# Patient Record
Sex: FEMALE | Race: WHITE | NOT HISPANIC OR LATINO
[De-identification: names, ages, dates, MRNs, and addresses within clinical notes are randomized per-mention and may not be internally consistent; named-entity substitution may affect disease eponyms.]

---

## 2019-02-25 ENCOUNTER — APPOINTMENT (OUTPATIENT)
Dept: NEUROLOGY | Facility: CLINIC | Age: 44
End: 2019-02-25
Payer: COMMERCIAL

## 2019-02-25 VITALS
SYSTOLIC BLOOD PRESSURE: 144 MMHG | DIASTOLIC BLOOD PRESSURE: 78 MMHG | HEART RATE: 82 BPM | WEIGHT: 252 LBS | HEIGHT: 66 IN | BODY MASS INDEX: 40.5 KG/M2 | TEMPERATURE: 98.3 F

## 2019-02-25 DIAGNOSIS — I10 ESSENTIAL (PRIMARY) HYPERTENSION: ICD-10-CM

## 2019-02-25 DIAGNOSIS — Z82.3 FAMILY HISTORY OF STROKE: ICD-10-CM

## 2019-02-25 DIAGNOSIS — E78.00 PURE HYPERCHOLESTEROLEMIA, UNSPECIFIED: ICD-10-CM

## 2019-02-25 DIAGNOSIS — Z83.79 FAMILY HISTORY OF OTHER DISEASES OF THE DIGESTIVE SYSTEM: ICD-10-CM

## 2019-02-25 DIAGNOSIS — E11.9 TYPE 2 DIABETES MELLITUS W/OUT COMPLICATIONS: ICD-10-CM

## 2019-02-25 DIAGNOSIS — Z83.3 FAMILY HISTORY OF DIABETES MELLITUS: ICD-10-CM

## 2019-02-25 DIAGNOSIS — Z82.49 FAMILY HISTORY OF ISCHEMIC HEART DISEASE AND OTHER DISEASES OF THE CIRCULATORY SYSTEM: ICD-10-CM

## 2019-02-25 DIAGNOSIS — Z80.3 FAMILY HISTORY OF MALIGNANT NEOPLASM OF BREAST: ICD-10-CM

## 2019-02-25 DIAGNOSIS — F41.9 ANXIETY DISORDER, UNSPECIFIED: ICD-10-CM

## 2019-02-25 DIAGNOSIS — Z87.891 PERSONAL HISTORY OF NICOTINE DEPENDENCE: ICD-10-CM

## 2019-02-25 PROBLEM — Z00.00 ENCOUNTER FOR PREVENTIVE HEALTH EXAMINATION: Status: ACTIVE | Noted: 2019-02-25

## 2019-02-25 PROCEDURE — 99244 OFF/OP CNSLTJ NEW/EST MOD 40: CPT

## 2019-02-25 RX ORDER — PREDNISONE 20 MG/1
20 TABLET ORAL DAILY
Qty: 28 | Refills: 0 | Status: ACTIVE | COMMUNITY
Start: 2019-02-25 | End: 1900-01-01

## 2019-02-25 RX ORDER — VALACYCLOVIR 1 G/1
1 TABLET, FILM COATED ORAL 3 TIMES DAILY
Qty: 21 | Refills: 0 | Status: ACTIVE | COMMUNITY
Start: 2019-02-25 | End: 1900-01-01

## 2019-02-26 PROBLEM — E11.9 DIABETES MELLITUS: Status: ACTIVE | Noted: 2019-02-25

## 2019-02-26 PROBLEM — Z83.79 FAMILY HISTORY OF GALLSTONES: Status: ACTIVE | Noted: 2019-02-25

## 2019-02-26 PROBLEM — Z82.49 FAMILY HISTORY OF HYPERTENSION: Status: ACTIVE | Noted: 2019-02-25

## 2019-02-26 PROBLEM — Z82.3 FAMILY HISTORY OF CEREBROVASCULAR ACCIDENT (CVA): Status: ACTIVE | Noted: 2019-02-25

## 2019-02-26 PROBLEM — I10 HYPERTENSION: Status: ACTIVE | Noted: 2019-02-25

## 2019-02-26 PROBLEM — F41.9 ANXIETY: Status: ACTIVE | Noted: 2019-02-25

## 2019-02-26 PROBLEM — E78.00 HIGH CHOLESTEROL: Status: ACTIVE | Noted: 2019-02-25

## 2019-02-26 PROBLEM — Z80.3 FAMILY HISTORY OF MALIGNANT NEOPLASM OF BREAST: Status: ACTIVE | Noted: 2019-02-25

## 2019-02-26 PROBLEM — Z87.891 FORMER SMOKER: Status: ACTIVE | Noted: 2019-02-25

## 2019-02-26 PROBLEM — Z83.3 FAMILY HISTORY OF DIABETES MELLITUS: Status: ACTIVE | Noted: 2019-02-25

## 2019-02-26 RX ORDER — ATORVASTATIN CALCIUM 20 MG/1
20 TABLET, FILM COATED ORAL
Qty: 30 | Refills: 0 | Status: ACTIVE | COMMUNITY
Start: 2018-09-29

## 2019-02-26 RX ORDER — BLOOD SUGAR DIAGNOSTIC
STRIP MISCELLANEOUS
Qty: 50 | Refills: 0 | Status: ACTIVE | COMMUNITY
Start: 2018-09-04

## 2019-02-26 RX ORDER — METFORMIN HYDROCHLORIDE 1000 MG/1
TABLET, FILM COATED, EXTENDED RELEASE ORAL
Refills: 0 | Status: ACTIVE | COMMUNITY

## 2019-02-26 RX ORDER — RAMIPRIL 5 MG/1
5 CAPSULE ORAL
Refills: 0 | Status: ACTIVE | COMMUNITY

## 2019-02-26 RX ORDER — DULAGLUTIDE 1.5 MG/.5ML
1.5 INJECTION, SOLUTION SUBCUTANEOUS
Refills: 0 | Status: ACTIVE | COMMUNITY

## 2019-02-26 RX ORDER — ESCITALOPRAM OXALATE 10 MG/1
10 TABLET, FILM COATED ORAL
Refills: 0 | Status: ACTIVE | COMMUNITY

## 2019-02-26 RX ORDER — LANCETS 33 GAUGE
EACH MISCELLANEOUS
Qty: 100 | Refills: 0 | Status: ACTIVE | COMMUNITY
Start: 2018-09-04

## 2019-02-26 RX ORDER — METOPROLOL SUCCINATE 100 MG/1
100 TABLET, EXTENDED RELEASE ORAL
Qty: 30 | Refills: 0 | Status: ACTIVE | COMMUNITY
Start: 2018-08-29

## 2019-02-26 NOTE — HISTORY OF PRESENT ILLNESS
[FreeTextEntry1] : This is a 43 year old right handed woman who is being seen in neurologic consultation at the request of Dr. Guerra for right facial numbness.  Patient noted some numbness and tingling on the right side of her face on Monday.  This morning she noted difficulty with speaking and perhaps even chewing.  She felt her face was droopy.  She has no pain, no headache, no hearing loss.  She has chronic tinnitus from a previous viral labyrinthitis years ago.  She is recovering from a GI illness over the last week.  No change in taste, notes dry right eye.

## 2019-02-26 NOTE — REASON FOR VISIT
[Acute] : an acute visit [FreeTextEntry1] : Right side of mouth and cheek are numb mouth is dropping

## 2019-02-26 NOTE — CONSULT LETTER
[Dear  ___] : Dear  [unfilled], [Consult Letter:] : I had the pleasure of evaluating your patient, [unfilled]. [Consult Closing:] : Thank you very much for allowing me to participate in the care of this patient.  If you have any questions, please do not hesitate to contact me. [FreeTextEntry3] : Sincerely,\par \par Demond Radford M.D.\par

## 2019-02-26 NOTE — REVIEW OF SYSTEMS
[Numbness] : numbness [Anxiety] : anxiety [Diarrhea] : diarrhea [Joint Pain] : joint pain [Negative] : Heme/Lymph [As Noted in HPI] : as noted in HPI [FreeTextEntry4] : ringing in ears [FreeTextEntry9] : not new

## 2019-02-26 NOTE — ASSESSMENT
[FreeTextEntry1] : This is Bell's palsy- likely due to viral etiology.  \par Begin steroids.  Will discuss with Dr. Guerra as patient's blood sugars will be difficult to control.  She will be weaned slowly after one week.\par Valacyclovir 1 gram tid for one week.\par Lacrilube to the right eye at night and sleep with eye patch.  Artificial tears for dryness during the day.  This is to prevent corneal abrasaion as she cannot complete close her right eye.\par Imaging to see if any inflammation of Cranial nerve 7 exists.\par RTC in one week.

## 2019-02-26 NOTE — PHYSICAL EXAM
[FreeTextEntry1] : Physical examination \par General: No acute distress, Awake, Alert. \par Fundus: disc margins sharp.\par Neck: no Carotid bruit. \par Cardiovascular: Normal rate, Regular rhythm, No murmur. \par Chest - clear bilaterally\par Ear: TM clear bilaterally.\par \par Mental status \par Awake, alert, and oriented to person, time and place, Normal attention span and concentration, Recent and remote memory intact, Language intact, Fund of knowledge intact. \par Cranial Nerves \par II: VFF \par III, IV, VI: PERRL, EOMI. Inability to fully close her right eye.  \par V: Facial sensation is normal B/L.  Difficulty with raising eyebrow on the right. \par VII: Right facial droop.  Asymmetric smile \par VIII: Gross hearing is intact.  \par IX, X: Palate is midline and elevates symmetrically. \par XI: Trapezius normal strength. \par XII: Tongue midline without atrophy or fasciculations. \par \par Motor exam \par Muscle tone - no evidence of rigidity or resistance in all 4 extremities. \par No atrophy or fasciculations \par Muscle Strength: arms and legs, proximal and distal flexors and extensors are normal \par No UE drift.\par \par Reflexes \par All present, normal, and symmetrical. \par \par Coordination \par Finger to nose: Normal. \par Heel to shin: Normal. \par \par Sensory \par Intact sensation to vibration and cold.\par \par Gait \par Normal including heels, toes, and tandem gait. \par

## 2019-02-27 ENCOUNTER — TRANSCRIPTION ENCOUNTER (OUTPATIENT)
Age: 44
End: 2019-02-27

## 2019-03-04 ENCOUNTER — APPOINTMENT (OUTPATIENT)
Dept: NEUROLOGY | Facility: CLINIC | Age: 44
End: 2019-03-04
Payer: COMMERCIAL

## 2019-03-04 VITALS
SYSTOLIC BLOOD PRESSURE: 154 MMHG | WEIGHT: 252 LBS | DIASTOLIC BLOOD PRESSURE: 79 MMHG | TEMPERATURE: 97.1 F | HEIGHT: 66 IN | BODY MASS INDEX: 40.5 KG/M2 | HEART RATE: 76 BPM

## 2019-03-04 DIAGNOSIS — G51.0 BELL'S PALSY: ICD-10-CM

## 2019-03-04 PROCEDURE — 99213 OFFICE O/P EST LOW 20 MIN: CPT

## 2019-03-04 RX ORDER — MINERAL OIL AND PETROLATUM 150; 830 MG/G; MG/G
83-15 OINTMENT OPHTHALMIC
Qty: 1 | Refills: 0 | Status: ACTIVE | COMMUNITY
Start: 2019-03-04 | End: 1900-01-01

## 2019-03-04 RX ORDER — PREDNISONE 20 MG/1
20 TABLET ORAL
Qty: 14 | Refills: 0 | Status: ACTIVE | COMMUNITY
Start: 2019-03-04 | End: 1900-01-01

## 2019-03-04 NOTE — PHYSICAL EXAM
[FreeTextEntry1] : Physical examination \par General: No acute distress, Awake, Alert. \par Fundus: disc margins sharp.\par Neck: no Carotid bruit. \par Cardiovascular: Normal rate, Regular rhythm, No murmur. \par Chest - clear bilaterally\par \par Mental status \par Awake, alert, and oriented to person, time and place, Normal attention span and concentration, Recent and remote memory intact, Language intact, Fund of knowledge intact. \par Cranial Nerves \par II: VFF \par III, IV, VI: PERRL, EOMI. Inability to fully close her right eye.  \par V: Facial sensation is normal B/L.  Difficulty with raising eyebrow on the right. \par VII: Right facial droop.  Asymmetric smile \par VIII: Gross hearing is intact.  \par IX, X: Palate is midline and elevates symmetrically. \par XI: Trapezius normal strength. \par XII: Tongue midline without atrophy or fasciculations. \par \par Motor exam \par Muscle tone - no evidence of rigidity or resistance in all 4 extremities. \par No atrophy or fasciculations \par Muscle Strength: arms and legs, proximal and distal flexors and extensors are normal \par No UE drift.\par \par Reflexes \par All present, normal, and symmetrical. \par \par Coordination \par Finger to nose: Normal. \par Heel to shin: Normal. \par \par \par

## 2019-03-04 NOTE — ASSESSMENT
[FreeTextEntry1] : I will begin a prednisone taper. She will take 60 mg for 2 days, 40 mg for 2 days, 20 mg for 2 days, and 10 mg for 2 days and then stop. I encouraged her to get MRI of the brain. I would like her to use Lacri-Lube at night.Encouraged to get MRI. She will see me in followup in 6 weeks.\par

## 2019-03-04 NOTE — REASON FOR VISIT
[Follow-Up: _____] : a [unfilled] follow-up visit [FreeTextEntry1] : Follow up left side of the face still numbness

## 2019-03-04 NOTE — HISTORY OF PRESENT ILLNESS
[FreeTextEntry1] : Patient is here in follow up. She was last seen a week ago for Bell's palsy. Patient has not noted any improvement in her symptoms. She has not noticed any worsening of her symptoms. She is scheduled to finish the first week of prednisone tonight. She will also be finishing a full week course of bowel cycle of year. She had to reschedule her MRI from this morning to next week due to snow. She is complaining of lacrimation bilaterally. She notes that if she covers her right eye with an eye patch it improves symptoms on the left. She has been using artificial tears. Although she has been using the eye patch at night she did not use the Lacri-Lube. She is scheduled to see an ophthalmologist on Thursday.\par \par 2/25/19\par This is a 43 year old right handed woman who is being seen in neurologic consultation at the request of Dr. Guerra for right facial numbness.  Patient noted some numbness and tingling on the right side of her face on Monday.  This morning she noted difficulty with speaking and perhaps even chewing.  She felt her face was droopy.  She has no pain, no headache, no hearing loss.  She has chronic tinnitus from a previous viral labyrinthitis years ago.  She is recovering from a GI illness over the last week.  No change in taste, notes dry right eye.

## 2019-04-22 ENCOUNTER — APPOINTMENT (OUTPATIENT)
Dept: NEUROLOGY | Facility: CLINIC | Age: 44
End: 2019-04-22

## 2020-07-22 ENCOUNTER — TRANSCRIPTION ENCOUNTER (OUTPATIENT)
Age: 45
End: 2020-07-22